# Patient Record
Sex: FEMALE | Race: WHITE | URBAN - METROPOLITAN AREA
[De-identification: names, ages, dates, MRNs, and addresses within clinical notes are randomized per-mention and may not be internally consistent; named-entity substitution may affect disease eponyms.]

---

## 2021-10-14 ENCOUNTER — OFFICE VISIT (OUTPATIENT)
Dept: URBAN - METROPOLITAN AREA CLINIC 3 | Facility: CLINIC | Age: 41
End: 2021-10-14
Payer: COMMERCIAL

## 2021-10-14 DIAGNOSIS — B02.9 ZOSTER: Primary | ICD-10-CM

## 2021-10-14 DIAGNOSIS — H43.813 VITREOUS DEGENERATION, BILATERAL: ICD-10-CM

## 2021-10-14 PROCEDURE — 99204 OFFICE O/P NEW MOD 45 MIN: CPT | Performed by: OPTOMETRIST

## 2021-10-14 ASSESSMENT — INTRAOCULAR PRESSURE
OS: 21
OD: 18

## 2021-10-14 NOTE — IMPRESSION/PLAN
Impression: Zoster: B02.9. Plan: Continue current treatment of 1 gm valtrex tid for 10 days as previously prescribed.